# Patient Record
Sex: FEMALE | Race: WHITE | URBAN - METROPOLITAN AREA
[De-identification: names, ages, dates, MRNs, and addresses within clinical notes are randomized per-mention and may not be internally consistent; named-entity substitution may affect disease eponyms.]

---

## 2017-01-03 DIAGNOSIS — E03.9 HYPOTHYROIDISM, UNSPECIFIED TYPE: Primary | ICD-10-CM

## 2017-01-03 RX ORDER — LEVOTHYROXINE SODIUM 150 UG/1
150 TABLET ORAL DAILY
Qty: 60 TABLET | Refills: 1 | Status: SHIPPED
Start: 2017-01-03 | End: 2017-04-03

## 2017-02-24 DIAGNOSIS — I10 ESSENTIAL HYPERTENSION: ICD-10-CM

## 2017-02-26 RX ORDER — AMLODIPINE BESYLATE 10 MG/1
10 TABLET ORAL DAILY
Qty: 30 TABLET | Refills: 0 | Status: SHIPPED
Start: 2017-02-26 | End: 2017-06-09

## 2017-02-26 RX ORDER — LOSARTAN POTASSIUM 100 MG/1
100 TABLET ORAL DAILY
Qty: 30 TABLET | Refills: 0 | Status: SHIPPED
Start: 2017-02-26 | End: 2017-06-09

## 2017-02-26 NOTE — TELEPHONE ENCOUNTER
Refilled Carrie's blood pressure medications (amlodipine and losartan) for 1 month. Her blood pressures have been elevated in clinic, so she needs to be seen for blood pressure check possible titration of meds.     Marcelina Allan, DO

## 2017-02-27 DIAGNOSIS — Z79.4 TYPE 2 DIABETES MELLITUS WITH HYPERGLYCEMIA, WITH LONG-TERM CURRENT USE OF INSULIN (H): ICD-10-CM

## 2017-02-27 DIAGNOSIS — E11.65 TYPE 2 DIABETES MELLITUS WITH HYPERGLYCEMIA, WITH LONG-TERM CURRENT USE OF INSULIN (H): ICD-10-CM

## 2017-02-28 NOTE — TELEPHONE ENCOUNTER
CALLED AND REACHED PATIENT BUT PER PATIENT WILL CALL CLINIC BACK TO SCHEDULE AN APPT WITH DR HANSON SINCE WHEN SHE GETS BACK FROM OUT OF TOWN.

## 2017-03-01 NOTE — TELEPHONE ENCOUNTER
Refill request for metformin. Due for A1c. Borderline kidney function.    Will refill metformin for 1 month and route message to  to call Sophia[facility] to schedule an appointment in the next few weeks.     Marcelina Allan, DO

## 2017-04-03 DIAGNOSIS — E03.9 HYPOTHYROIDISM, UNSPECIFIED TYPE: ICD-10-CM

## 2017-04-05 RX ORDER — LEVOTHYROXINE SODIUM 150 UG/1
150 TABLET ORAL DAILY
Qty: 30 TABLET | Refills: 0 | Status: SHIPPED | OUTPATIENT
Start: 2017-04-05 | End: 2017-06-19 | Stop reason: DRUGHIGH

## 2017-04-05 NOTE — TELEPHONE ENCOUNTER
Received refill request for levothyroxine. Per chart review, last TSH was April 2016. TSH has been within normal limits. No appointments scheduled.    I have refilled levothyroxine for 1 month. I will send message to  to schedule an appointment.     1. Hypothyroidism, unspecified type  - levothyroxine (SYNTHROID/LEVOTHROID) 150 MCG tablet; Take 1 tablet (150 mcg) by mouth daily  Dispense: 30 tablet; Refill: 0    Marcelina Allan DO

## 2017-04-06 ENCOUNTER — TRANSFERRED RECORDS (OUTPATIENT)
Dept: HEALTH INFORMATION MANAGEMENT | Facility: CLINIC | Age: 72
End: 2017-04-06

## 2017-04-10 NOTE — TELEPHONE ENCOUNTER
Called and spoke to Patient, she states is still not in town yet and not able to schedule appt. She states she is just on break and the phone click, got disconnected. Tried calling her back but she didn't answer.

## 2017-06-09 ENCOUNTER — OFFICE VISIT (OUTPATIENT)
Dept: FAMILY MEDICINE | Facility: CLINIC | Age: 72
End: 2017-06-09

## 2017-06-09 VITALS
RESPIRATION RATE: 20 BRPM | WEIGHT: 215.8 LBS | BODY MASS INDEX: 34.68 KG/M2 | TEMPERATURE: 98 F | DIASTOLIC BLOOD PRESSURE: 74 MMHG | HEIGHT: 66 IN | HEART RATE: 66 BPM | SYSTOLIC BLOOD PRESSURE: 155 MMHG | OXYGEN SATURATION: 96 %

## 2017-06-09 DIAGNOSIS — Z13.9 SCREENING: ICD-10-CM

## 2017-06-09 DIAGNOSIS — I10 ESSENTIAL HYPERTENSION: ICD-10-CM

## 2017-06-09 DIAGNOSIS — E11.65 TYPE 2 DIABETES MELLITUS WITH HYPERGLYCEMIA, WITH LONG-TERM CURRENT USE OF INSULIN (H): Primary | ICD-10-CM

## 2017-06-09 DIAGNOSIS — E11.9 TYPE 2 DIABETES MELLITUS WITHOUT COMPLICATION, UNSPECIFIED LONG TERM INSULIN USE STATUS: ICD-10-CM

## 2017-06-09 DIAGNOSIS — Z79.4 DIABETES MELLITUS, TYPE II, INSULIN DEPENDENT (H): ICD-10-CM

## 2017-06-09 DIAGNOSIS — E11.9 DIABETES MELLITUS, TYPE II, INSULIN DEPENDENT (H): ICD-10-CM

## 2017-06-09 DIAGNOSIS — Z79.4 TYPE 2 DIABETES MELLITUS WITH HYPERGLYCEMIA, WITH LONG-TERM CURRENT USE OF INSULIN (H): Primary | ICD-10-CM

## 2017-06-09 DIAGNOSIS — I10 BENIGN ESSENTIAL HYPERTENSION: ICD-10-CM

## 2017-06-09 DIAGNOSIS — E21.0: ICD-10-CM

## 2017-06-09 DIAGNOSIS — E03.9 HYPOTHYROIDISM, UNSPECIFIED TYPE: ICD-10-CM

## 2017-06-09 LAB
BUN SERPL-MCNC: 27 MG/DL (ref 7–30)
CALCIUM SERPL-MCNC: 10.8 MG/DL (ref 8.5–10.4)
CHLORIDE SERPLBLD-SCNC: 100 MMOL/L (ref 94–109)
CO2 SERPL-SCNC: 29 MMOL/L (ref 20–32)
CREAT SERPL-MCNC: 1.2 MG/DL (ref 0.6–1.3)
EGFR CALCULATED (BLACK REFERENCE): 56.6 ML/MIN
EGFR CALCULATED (NON BLACK REFERENCE): 46.8 ML/MIN
GLUCOSE SERPL-MCNC: 214 MG/DL (ref 60–109)
HBA1C MFR BLD: 8.2 % (ref 4.1–5.7)
PARATHYROID HORMONE: 151 PG/ML (ref 10–86)
POTASSIUM SERPL-SCNC: 3.8 MMOL/L (ref 3.4–5.3)
SODIUM SERPL-SCNC: 141 MMOL/L (ref 133–144)
TSH SERPL DL<=0.05 MIU/L-ACNC: 0.03 UIU/ML (ref 0.3–5)

## 2017-06-09 RX ORDER — AMLODIPINE BESYLATE 10 MG/1
10 TABLET ORAL DAILY
Qty: 90 TABLET | Refills: 3 | Status: SHIPPED | OUTPATIENT
Start: 2017-06-09

## 2017-06-09 RX ORDER — CARVEDILOL 25 MG/1
25 TABLET ORAL 2 TIMES DAILY WITH MEALS
Qty: 180 TABLET | Refills: 3 | Status: SHIPPED | OUTPATIENT
Start: 2017-06-09

## 2017-06-09 RX ORDER — HYDROCHLOROTHIAZIDE 12.5 MG/1
12.5 TABLET ORAL DAILY
Qty: 90 TABLET | Refills: 3 | Status: SHIPPED | OUTPATIENT
Start: 2017-06-09

## 2017-06-09 RX ORDER — LOSARTAN POTASSIUM 100 MG/1
100 TABLET ORAL DAILY
Qty: 90 TABLET | Refills: 3 | Status: SHIPPED | OUTPATIENT
Start: 2017-06-09

## 2017-06-09 NOTE — MR AVS SNAPSHOT
After Visit Summary   2017    Carrie Lovell    MRN: [id number]           Patient Information     Date Of Birth          1945        Visit Information        Provider Department      2017 3:20 PM Radha Orozco MD [facility]        Today's Diagnoses     Type 2 diabetes mellitus with hyperglycemia, with long-term current use of insulin (H)    -  1    Screening        Essential hypertension        Type 2 diabetes mellitus without complication, unspecified long term insulin use status (H)        Benign essential hypertension        Diabetes mellitus, type II, insulin dependent (H)           Follow-ups after your visit        Who to contact     Please call your clinic at [phone] to:    Ask questions about your health    Make or cancel appointments    Discuss your medicines    Learn about your test results    Speak to your doctor   If you have compliments or concerns about an experience at your clinic, or if you wish to file a complaint, please contact [facility] Patient Relations at [phone] or email us at [email or web address]         Additional Information About Your Visit        MyChart Information     [employer] is an electronic gateway that provides easy, online access to your medical records. With [employer], you can request a clinic appointment, read your test results, renew a prescription or communicate with your care team.     To sign up for [employer] visit the website at [email or web address]   You will be asked to enter the access code listed below, as well as some personal information. Please follow the directions to create your username and password.     Your access code is: [email or web address]  Expires: 2017  8:30 AM     Your access code will  in 90 days. If you need help or a new code, please contact your [facility] or call [phone] for assistance.        Care EveryWhere ID   "   This is your Care EveryWhere ID. This could be used by other organizations to access your [facility] medical records  [id number]        Your Vitals Were     Pulse Temperature Respirations Height Pulse Oximetry BMI (Body Mass Index)    66 98  F (36.7  C) (Oral) 20 5' 6\" (167.6 cm) 96% 34.83 kg/m2       Blood Pressure from Last 3 Encounters:   06/09/17 155/74   09/16/16 163/74   09/16/16 163/74    Weight from Last 3 Encounters:   06/09/17 215 lb 12.8 oz (97.9 kg)   09/16/16 232 lb 12.8 oz (105.6 kg)   09/16/16 232 lb 12.8 oz (105.6 kg)              We Performed the Following     Basic Metabolic Panel ([facility])  - Results < 1 hr     Hemoglobin A1c ([facility])     Parathyroid Horm.Intact ([facility])     TSH  Sensitive ([facility])     Vitamin D 25-Hydroxy ([facility])          Today's Medication Changes          These changes are accurate as of: 6/9/17 11:59 PM.  If you have any questions, ask your nurse or doctor.               These medicines have changed or have updated prescriptions.        Dose/Directions    insulin aspart 100 UNIT/ML injection   Commonly known as:  NovoLOG FLEXPEN   This may have changed:  additional instructions   Used for:  Type 2 diabetes mellitus with hyperglycemia, with long-term current use of insulin (H)   Changed by:  Radha Orozco MD        6 units before breakfast and dinner   Quantity:  15 mL   Refills:  3         Stop taking these medicines if you haven't already. Please contact your care team if you have questions.     metFORMIN 500 MG tablet   Commonly known as:  GLUCOPHAGE   Stopped by:  Radha Orozco MD                Where to get your medicines      These medications were sent to [facility] - [facility], [place] - [address]  [address] [address]     Phone:  [phone]     amLODIPine 10 MG tablet    carvedilol 25 MG tablet    hydrochlorothiazide 12.5 MG Tabs tablet    insulin aspart 100 UNIT/ML injection    " insulin glargine 100 UNIT/ML injection    losartan 100 MG tablet                Primary Care Provider Office Phone # Fax #    Radha Angie Orozco [phone]       [facility] [address] [place] [address]        Thank you!     Thank you for choosing [facility]  for your care. Our goal is always to provide you with excellent care. Hearing back from our patients is one way we can continue to improve our services. Please take a few minutes to complete the written survey that you may receive in the mail after your visit with us. Thank you!             Your Updated Medication List - Protect others around you: Learn how to safely use, store and throw away your medicines at www.disposemymeds.org.          This list is accurate as of: 6/9/17 11:59 PM.  Always use your most recent med list.                   Brand Name Dispense Instructions for use    amLODIPine 10 MG tablet    NORVASC    90 tablet    Take 1 tablet (10 mg) by mouth daily       aspirin 81 MG tablet      Take 1 tablet by mouth daily       blood glucose monitoring lancets     3 Box    For use in checking blood sugar.  Test sugars three times daily       blood glucose monitoring test strip    FREESTYLE LITE    300 each    Use one test strip three times daily to check blood sugars       blood pressure kit Kit     1 kit    Dispense 1 kit, check BP daily.       CALCIUM 600+D 600-200 MG-UNIT Tabs   Generic drug:  calcium carbonate-vitamin D      Take  by mouth daily.       carvedilol 25 MG tablet    COREG    180 tablet    Take 1 tablet (25 mg) by mouth 2 times daily (with meals)       hydrochlorothiazide 12.5 MG Tabs tablet     90 tablet    Take 1 tablet (12.5 mg) by mouth daily       insulin aspart 100 UNIT/ML injection    NovoLOG FLEXPEN    15 mL    6 units before breakfast and dinner       insulin glargine 100 UNIT/ML injection    LANTUS SOLOSTAR    15 mL    Inject 18 Units Subcutaneous At Bedtime        insulin pen needle 30G X 8 MM    NOVOFINE    180 each    For BID insulin injection       levothyroxine 150 MCG tablet    SYNTHROID/LEVOTHROID    30 tablet    Take 1 tablet (150 mcg) by mouth daily       losartan 100 MG tablet    COZAAR    90 tablet    Take 1 tablet (100 mg) by mouth daily       POTASSIUM GLUCONATE          simvastatin 40 MG tablet    ZOCOR    90 tablet    Take 1 tablet (40 mg) by mouth At Bedtime

## 2017-06-09 NOTE — Clinical Note
Dr. Haque, ROSEANN- she agrees with our plan. Slade- can you check early Sept if pt has scheduled the DEXA and 24hr urine calcium tests? (She's out of town until then). Thanks.

## 2017-06-12 LAB — 25(OH)D3 SERPL-MCNC: 50.8 NG/ML (ref 30–80)

## 2017-06-13 DIAGNOSIS — E11.65 TYPE 2 DIABETES MELLITUS WITH HYPERGLYCEMIA, WITH LONG-TERM CURRENT USE OF INSULIN (H): ICD-10-CM

## 2017-06-13 DIAGNOSIS — E03.9 HYPOTHYROIDISM, UNSPECIFIED TYPE: ICD-10-CM

## 2017-06-13 DIAGNOSIS — Z79.4 TYPE 2 DIABETES MELLITUS WITH HYPERGLYCEMIA, WITH LONG-TERM CURRENT USE OF INSULIN (H): ICD-10-CM

## 2017-06-14 PROBLEM — E21.0: Status: ACTIVE | Noted: 2017-06-14

## 2017-06-14 NOTE — PROGRESS NOTES
Preceptor Attestation:  Patient's case reviewed and discussed with Radha Orozco MD.  Patient seen and discussed with the resident.  I agree with assessment and plan of care.  Supervising Physician:  Linda Haque MD  [facility]

## 2017-06-19 ENCOUNTER — RECORDS - HEALTHEAST (OUTPATIENT)
Dept: ADMINISTRATIVE | Facility: OTHER | Age: 72
End: 2017-06-19

## 2017-06-19 RX ORDER — LEVOTHYROXINE SODIUM 125 UG/1
125 TABLET ORAL DAILY
Qty: 60 TABLET | Refills: 0 | Status: SHIPPED | OUTPATIENT
Start: 2017-06-19

## 2017-06-19 RX ORDER — LEVOTHYROXINE SODIUM 150 UG/1
150 TABLET ORAL DAILY
Qty: 90 TABLET | OUTPATIENT
Start: 2017-06-19

## 2017-06-19 RX ORDER — LEVOTHYROXINE SODIUM 100 UG/1
100 TABLET ORAL DAILY
Qty: 60 TABLET | Refills: 0 | Status: SHIPPED | OUTPATIENT
Start: 2017-06-19 | End: 2017-08-01

## 2017-06-19 NOTE — PROGRESS NOTES
Reviewed results. Called patient.  We discussed plan to slowly taper down her Synthroid.  She will do 125 mcg for 60 days, followed by 100 mcg for 60 days, followed by a TSH which will be 4 months out from recent testing.  She prefers the 60 day prescriptions as she is headed out of town tomorrow for a long trip.  I will send prescriptions for both of these thyroid doses.  Additionally we discussed her primary hyperparathyroidism diagnosed based off of these labs with , calcium 10.8, normal vitamin D.  She is agreeable to a DEXA scan and 24 hour urine testing about 2 months from now when she returns from her trip.  We will have her clinic call her then to schedule those.

## 2017-06-19 NOTE — PATIENT INSTRUCTIONS
Referral for Dexa Scan faxed to [facility] Sched[facility] and pt will be contacted to schedule appointment.

## 2017-07-20 ENCOUNTER — TRANSFERRED RECORDS (OUTPATIENT)
Dept: HEALTH INFORMATION MANAGEMENT | Facility: CLINIC | Age: 72
End: 2017-07-20

## 2017-08-01 DIAGNOSIS — E03.9 HYPOTHYROIDISM, UNSPECIFIED TYPE: ICD-10-CM

## 2017-08-04 RX ORDER — LEVOTHYROXINE SODIUM 125 UG/1
TABLET ORAL
Qty: 1 TABLET | Refills: 0 | OUTPATIENT
Start: 2017-08-04

## 2017-08-04 RX ORDER — LEVOTHYROXINE SODIUM 100 UG/1
100 TABLET ORAL DAILY
Qty: 60 TABLET | Refills: 0 | Status: SHIPPED | OUTPATIENT
Start: 2017-08-04

## 2017-08-18 RX ORDER — LANCETS 28 GAUGE
EACH MISCELLANEOUS
Qty: 100 EACH | Refills: 3 | Status: SHIPPED | OUTPATIENT
Start: 2017-08-18

## 2017-09-11 ENCOUNTER — MEDICAL CORRESPONDENCE (OUTPATIENT)
Dept: HEALTH INFORMATION MANAGEMENT | Facility: CLINIC | Age: 72
End: 2017-09-11

## 2017-09-14 ENCOUNTER — TELEPHONE (OUTPATIENT)
Dept: FAMILY MEDICINE | Facility: CLINIC | Age: 72
End: 2017-09-14

## 2017-09-14 NOTE — TELEPHONE ENCOUNTER
RX called in for lancets freestyle was approved in 08/18/17 for 100 plus 3 refill and the order is testing 3 times daily per last refill.

## 2017-09-18 ENCOUNTER — MEDICAL CORRESPONDENCE (OUTPATIENT)
Dept: HEALTH INFORMATION MANAGEMENT | Facility: CLINIC | Age: 72
End: 2017-09-18

## 2017-10-25 ENCOUNTER — TRANSFERRED RECORDS (OUTPATIENT)
Dept: HEALTH INFORMATION MANAGEMENT | Facility: CLINIC | Age: 72
End: 2017-10-25

## 2017-11-22 DIAGNOSIS — E78.2 MIXED HYPERLIPIDEMIA: ICD-10-CM

## 2017-11-27 RX ORDER — SIMVASTATIN 40 MG
40 TABLET ORAL AT BEDTIME
Qty: 90 TABLET | Refills: 3 | Status: SHIPPED | OUTPATIENT
Start: 2017-11-27

## 2018-01-30 ENCOUNTER — TELEPHONE (OUTPATIENT)
Dept: FAMILY MEDICINE | Facility: CLINIC | Age: 73
End: 2018-01-30

## 2018-01-30 NOTE — TELEPHONE ENCOUNTER
Spoke with medical examiner and have them emailing death certificate to clinic, will route message to . Ricardo AMES

## 2018-01-30 NOTE — TELEPHONE ENCOUNTER
[facility] phone call message- general phone call:    Reason for call: Calling in stating pt has passed and need a call back to confirm if Dr. Orozco will sign the death certificate.     Return call needed: Yes    OK to leave a message on voice mail? Yes    Primary language: English      needed? No    Call taken on January 30, 2018 at 2:09 PM by Basia Diamond

## 2018-02-01 NOTE — TELEPHONE ENCOUNTER
Shi called examiner to obtain additional information as I have not seen pt recently. Per examiner:     COD: Natural found in apartment supine position, secured by investigators; no evidence of drugs/alcohol. NO trauma/injury; no autposy performed       I will sign death record with this available information, also with DM2 and HTN as contributing factors.

## 2021-05-31 ENCOUNTER — RECORDS - HEALTHEAST (OUTPATIENT)
Dept: ADMINISTRATIVE | Facility: CLINIC | Age: 76
End: 2021-05-31

## 2021-06-02 ENCOUNTER — RECORDS - HEALTHEAST (OUTPATIENT)
Dept: ADMINISTRATIVE | Facility: CLINIC | Age: 76
End: 2021-06-02